# Patient Record
Sex: FEMALE | Race: WHITE | Employment: FULL TIME | URBAN - METROPOLITAN AREA
[De-identification: names, ages, dates, MRNs, and addresses within clinical notes are randomized per-mention and may not be internally consistent; named-entity substitution may affect disease eponyms.]

---

## 2023-02-10 ENCOUNTER — HOSPITAL ENCOUNTER (OUTPATIENT)
Dept: PHYSICAL THERAPY | Age: 58
Discharge: HOME | End: 2023-02-10
Payer: COMMERCIAL

## 2023-02-10 PROCEDURE — 97110 THERAPEUTIC EXERCISES: CPT | Performed by: PHYSICAL THERAPIST

## 2023-02-10 PROCEDURE — 97162 PT EVAL MOD COMPLEX 30 MIN: CPT | Performed by: PHYSICAL THERAPIST

## 2023-02-10 PROCEDURE — 97016 VASOPNEUMATIC DEVICE THERAPY: CPT | Performed by: PHYSICAL THERAPIST

## 2023-02-10 NOTE — PROGRESS NOTES
PT INITIAL EVALUATION NOTE 2-15    Patient Name: Niki Hopkins  Date:2/10/2023  : 1965  [x]  Patient  Verified  Payor: Renate Worthy / Plan: [address] / Product Type: Commerical /    In time:10:45 AM  Out time:11:45 AM  Total Treatment Time (min): 60  Visit #: 1     Treatment Area: Other low back pain [M54.59]  Pain in right hip [M25.551]    SUBJECTIVE  Pain Level (0-10 scale): 3/10  Any medication changes, allergies to medications, adverse drug reactions, diagnosis change, or new procedure performed?: [] No    [x] Yes (see summary sheet for update)  Subjective:     R low back pain  PLOF: No limitations with walking, standing, work tasks  Mechanism of Injury: Insidious, began in 2022 with R lateral hip pain. The pain was severe enough she went to a local ED at the time and imaging was negative. She then followed-up with her PCP, who prescribed Prednisone. This resolved the lateral R hip pain, but then she was left with a tightness in her R low back that feels like it wants to go into spasm. The pain is relieved by bending forward and resting her upper body on a table. Previous Treatment/Compliance: She has tried some stretching at home with mild relief. PMHx/Surgical Hx: She had an episode of L knee hyperextension shortly before the R hip pain started. She believes that the postural changes that occurred to protect her L knee caused the R back pain. Work Hx: She has tried adjusting her chair at work with some minor relief. The biggest aggravating factor occurs when she goes to stand up from her chair after sitting for several hours. 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 Living Situation: lives in a two story home with   Pt Goals: to reduce pain and improve ability stand from a chair, protect reoccurrences.    Barriers: none  Motivation: motivated  Substance use: none   Cognition: A & O x 4        OBJECTIVE/EXAMINATION  Posture: R shoulder lower than left  Gait and Functional Mobility:    Gait: WNL  Palpation: TTP along R lower lumbar paraspinals, PSIS  Joint Mobility:NT        Lumbar AROM:        R  L  Flexion    6\" from floor        Extension   WNL  WNL        Side Bending   WNL  Limited to 4\" from knee        Rotation   Limited  WNL          Aberrant movements:  Painful arc: [] Yes  [x] No  Instability catch: [] Yes  [x] No  Difficulty returning from flexion: [] Yes  [x] No  Reversal of lumbopelvic rhythm: [x] Yes  [] No             MMT:  All lumbar myotomes: >4/5  Neurological: Sensation: Intact  Special Tests:        Slump: Negative   Chris: NT        Kell: R low back pain at full hip extension    SLR: Positive at 60 degrees for R low back pain   ESTHELA: Positive for R low back pain      Practed several sit-to-stands with a normal pelvic position and in a position of L AF IR. No difference reported from patient and the maneuver was painful each time.     Modality rationale: decrease inflammation and decrease pain to improve the patients ability to sit for prolonged periods without pain   Min Type Additional Details    [] Estim: []Att   []Unatt        []TENS instruct                  []IFC  []Premod   []NMES                     []Other:  []w/US   []w/ice   []w/heat  Position:  Location:    []  Traction: [] Cervical       []Lumbar                       [] Prone          []Supine                       []Intermittent   []Continuous Lbs:  [] before manual  [] after manual  []w/heat    []  Ultrasound: []Continuous   [] Pulsed at:                            []1MHz   []3MHz Location:  W/cm2:    []  Paraffin         Location:  []w/heat    []  Ice     []  Heat  []  Ice massage Position:  Location:    []  Laser  []  Other: Position:  Location:   15 [x]  Vasopneumatic Device Pressure:       [x] lo [] med [] hi   Temperature: 34   [x] Skin assessment post-treatment:  [x]intact []redness- no adverse reaction    []redness - adverse reaction:     25 min Therapeutic Exercise:  [x] See flow sheet :   Rationale: increase ROM, increase strength, and improve coordination to improve the patients ability to perform a sit-to-stand without pain    With   [] TE   [] TA   [] Neuro   [] SC   [] other: Patient Education: [x] Review HEP    [] Progressed/Changed HEP based on:   [] positioning   [] body mechanics   [] transfers   [] heat/ice application    [] other:        Other Objective/Functional Measures: FOTO Functional Measure: 48/100                  Pain Level (0-10 scale) post treatment: 1/10      ASSESSMENT:      [x]  See Plan of Suraj Saenz, PT 2/10/2023

## 2023-02-10 NOTE — THERAPY EVALUATION
Physical Therapy at [facility],   a part of  [facility]  [address] [facility], [address] [place] [address] Drive  Phone: [phone]  Fax: [phone]    Plan of Care/ Statement of Necessity for Physical Therapy Services 2-15    Patient name: Leonardo Solano  : 1965  Provider#: [id number]  Referral source: Kevan Kasper, *      Medical/Treatment Diagnosis: Other low back pain [M54.59]  Pain in right hip [M25.551]     Prior Hospitalization: see medical history     Comorbidities: Obesity  Prior Level of Function: see initial eval  Medications: Verified on Patient Summary List    Start of Care: 2/10/2023      Onset Date: 2022       The Plan of Care and following information is based on the information from the initial evaluation. Assessment/ key information: The patient presents with right low back pain with previous radiation into the R hip, although that has resolved since a round of oral steroids several months ago. She demonstrates postural dysfunction, reduced ROM, and impaired lumbopelvic stability that could be contributing to the chronicity of her symptoms. Evaluation Complexity History MEDIUM  Complexity : 1-2 comorbidities / personal factors will impact the outcome/ POC ; Examination MEDIUM Complexity : 3 Standardized tests and measures addressing body structure, function, activity limitation and / or participation in recreation  ;Presentation MEDIUM Complexity : Evolving with changing characteristics  ; Clinical Decision Making MEDIUM Complexity : FOTO score of 26-74  Overall Complexity Rating: MEDIUM    Problem List: pain affecting function, decrease ROM, decrease strength, impaired gait/ balance, decrease ADL/ functional abilitiies, decrease activity tolerance, decrease flexibility/ joint mobility, and decrease transfer abilities   Treatment Plan may include any combination of the following: Therapeutic exercise, Neuromuscular reeducation, Manual therapy, Therapeutic activity, Self care/home management, Electric stim unattended , Vasopneumatic device, and Needle insertion w/o injection (1 or 2 muscles)  Patient / Family readiness to learn indicated by: asking questions, trying to perform skills, and interest  Persons(s) to be included in education: patient (P)  Barriers to Learning/Limitations: None  Patient Goal (s): I want to be able to get up from a chair without pain.   Patient Self Reported Health Status: excellent  Rehabilitation Potential: excellent    Short Term Goals: To be accomplished in 4 weeks:  Patient will be able to sit for 30 minutes with <2/10 R low back pain reported. Patient will be able to stand for 30 minutes with <2/10 R low back pain reported. Patient will be able to walk two blocks with <2/10 R low back pain reported. Long Term Goals: To be accomplished in 12 weeks:  Patient will be able to perform a sit-to-stand transfer without pain following a period of sitting for >1 hour. Patient will be able to  a 20# box from the floor without pain or limitation. Patient will be able to walk 1/4 mile without pain or limitation. Frequency / Duration: Patient to be seen 2 times per week for 12 weeks. Patient/ Caregiver education and instruction: self care, activity modification, and exercises    [x]  Plan of care has been reviewed with WOLF De Paz, PT 2/10/2023     ________________________________________________________________________    I certify that the above Therapy Services are being furnished while the patient is under my care. I agree with the treatment plan and certify that this therapy is necessary.     [de-identified] Signature:____________________  Date:____________Time: _________      Armand Parmar, *

## 2023-02-14 ENCOUNTER — HOSPITAL ENCOUNTER (OUTPATIENT)
Dept: PHYSICAL THERAPY | Age: 58
Discharge: HOME | End: 2023-02-14
Payer: COMMERCIAL

## 2023-02-14 PROCEDURE — 97110 THERAPEUTIC EXERCISES: CPT | Performed by: PHYSICAL THERAPIST

## 2023-02-14 PROCEDURE — 97016 VASOPNEUMATIC DEVICE THERAPY: CPT | Performed by: PHYSICAL THERAPIST

## 2023-02-14 NOTE — PROGRESS NOTES
PT DAILY TREATMENT NOTE 2-15    Patient Name: Carmelita Cisse  Date:2023  : 1965  [x]  Patient  Verified  Payor: Keegan Khalil / Plan: [address] / Product Type: Commerical /    In time: 9:30 AM  Out time: 10:25 AM  Total Treatment Time (min): 55  Visit #:  2    Treatment Area: Other low back pain [M54.59]  Pain in right hip [M25.551]    SUBJECTIVE  Pain Level (0-10 scale): 210  Any medication changes, allergies to medications, adverse drug reactions, diagnosis change, or new procedure performed?: [x] No    [] Yes (see summary sheet for update)  Subjective functional status/changes:   [] No changes reported  Patient reports that the HEP is going well and she feels like it is helping.     OBJECTIVE            Modality rationale: decrease inflammation and decrease pain to improve the patients ability to sit for prolonged periods without pain    Min Type Additional Details     [] Estim: []Att   []Unatt        []TENS instruct                  []IFC  []Premod   []NMES                     []Other:  []w/US   []w/ice   []w/heat  Position:  Location:     []  Traction: [] Cervical       []Lumbar                       [] Prone          []Supine                       []Intermittent   []Continuous Lbs:  [] before manual  [] after manual  []w/heat     []  Ultrasound: []Continuous   [] Pulsed at:                            []1MHz   []3MHz Location:  W/cm2:     []  Paraffin         Location:  []w/heat     []  Ice     []  Heat  []  Ice massage Position:  Location:     []  Laser  []  Other: Position:  Location:   15 [x]  Vasopneumatic Device Pressure:       [x] lo [] med [] hi   Temperature: 34   [x] Skin assessment post-treatment:  [x]intact []redness- no adverse reaction    []redness - adverse reaction:      40 min Therapeutic Exercise:  [x] See flow sheet :   Rationale: increase ROM, increase strength, and improve coordination to improve the patients ability to perform a sit-to-stand without pain          With   [] TE   [] TA   [] Neuro   [] SC   [] other: Patient Education: [x] Review HEP    [] Progressed/Changed HEP based on:   [] positioning   [] body mechanics   [] transfers   [] heat/ice application    [] other:      Other Objective/Functional Measures:   Unable to complete recumbent bike due to L knee pain     Pain Level (0-10 scale) post treatment: 0    ASSESSMENT/Changes in Function:   Patient will continue to benefit from skilled PT services to modify and progress therapeutic interventions, address functional mobility deficits, address ROM deficits, address strength deficits, analyze and address soft tissue restrictions, analyze and cue movement patterns, analyze and modify body mechanics/ergonomics, and assess and modify postural abnormalities to attain remaining goals. []  See Plan of Care  []  See progress note/recertification  []  See Discharge Summary         Progress towards goals / Updated goals:  Patient is showing good initial progress with PT with improved tolerance to sit-to-stand transfers at the end of the session.     PLAN  [x]  Upgrade activities as tolerated     [x]  Continue plan of care  []  Update interventions per flow sheet       []  Discharge due to:_  []  Other:_      Rut Terrazas, PT 2/14/2023

## 2023-02-16 ENCOUNTER — HOSPITAL ENCOUNTER (OUTPATIENT)
Dept: PHYSICAL THERAPY | Age: 58
Discharge: HOME | End: 2023-02-16
Payer: COMMERCIAL

## 2023-02-16 PROCEDURE — 97016 VASOPNEUMATIC DEVICE THERAPY: CPT | Performed by: PHYSICAL THERAPIST

## 2023-02-16 PROCEDURE — 97110 THERAPEUTIC EXERCISES: CPT | Performed by: PHYSICAL THERAPIST

## 2023-02-16 NOTE — PROGRESS NOTES
PT DAILY TREATMENT NOTE 2-15    Patient Name: Rosendo Yi  Date:2023  : 1965  [x]  Patient  Verified  Payor: Samia Riser / Plan: [address] / Product Type: Commerical /    In time: 4:15 PM Out time: 5:10 PM  Total Treatment Time (min): 55  Visit #:  3    Treatment Area: Other low back pain [M54.59]  Pain in right hip [M25.551]    SUBJECTIVE  Pain Level (0-10 scale): 0/10  Any medication changes, allergies to medications, adverse drug reactions, diagnosis change, or new procedure performed?: [x] No    [] Yes (see summary sheet for update)  Subjective functional status/changes:   [] No changes reported  Patient reports continued improvement at her low back and has started doing the seated stretching during the day at work.     OBJECTIVE            Modality rationale: decrease inflammation and decrease pain to improve the patients ability to sit for prolonged periods without pain    Min Type Additional Details     [] Estim: []Att   []Unatt        []TENS instruct                  []IFC  []Premod   []NMES                     []Other:  []w/US   []w/ice   []w/heat  Position:  Location:     []  Traction: [] Cervical       []Lumbar                       [] Prone          []Supine                       []Intermittent   []Continuous Lbs:  [] before manual  [] after manual  []w/heat     []  Ultrasound: []Continuous   [] Pulsed at:                            []1MHz   []3MHz Location:  W/cm2:     []  Paraffin         Location:  []w/heat     []  Ice     []  Heat  []  Ice massage Position:  Location:     []  Laser  []  Other: Position:  Location:   15 [x]  Vasopneumatic Device Pressure:       [x] lo [] med [] hi   Temperature: 34   [x] Skin assessment post-treatment:  [x]intact []redness- no adverse reaction    []redness - adverse reaction:      40 min Therapeutic Exercise:  [x] See flow sheet :   Rationale: increase ROM, increase strength, and improve coordination to improve the patients ability to perform a sit-to-stand without pain          With   [] TE   [] TA   [] Neuro   [] SC   [] other: Patient Education: [x] Review HEP    [] Progressed/Changed HEP based on:   [] positioning   [] body mechanics   [] transfers   [] heat/ice application    [] other:      Other Objective/Functional Measures:   No increase in LBP with new strengthening exercises. Pain Level (0-10 scale) post treatment: 0    ASSESSMENT/Changes in Function:   Patient will continue to benefit from skilled PT services to modify and progress therapeutic interventions, address functional mobility deficits, address ROM deficits, address strength deficits, analyze and address soft tissue restrictions, analyze and cue movement patterns, analyze and modify body mechanics/ergonomics, and assess and modify postural abnormalities to attain remaining goals. []  See Plan of Care  []  See progress note/recertification  []  See Discharge Summary         Progress towards goals / Updated goals:  Excellent progress this past week and will begin to shift focus from stretching to lumbopelvic stability exercises.     PLAN  [x]  Upgrade activities as tolerated     [x]  Continue plan of care  []  Update interventions per flow sheet       []  Discharge due to:_  []  Other:_      Jamel Vazquez, PT 2/16/2023

## 2023-02-20 ENCOUNTER — HOSPITAL ENCOUNTER (OUTPATIENT)
Dept: PHYSICAL THERAPY | Age: 58
Discharge: HOME | End: 2023-02-20
Payer: COMMERCIAL

## 2023-02-20 PROCEDURE — 97110 THERAPEUTIC EXERCISES: CPT | Performed by: PHYSICAL MEDICINE & REHABILITATION

## 2023-02-20 PROCEDURE — 97016 VASOPNEUMATIC DEVICE THERAPY: CPT | Performed by: PHYSICAL MEDICINE & REHABILITATION

## 2023-02-20 NOTE — PROGRESS NOTES
PT DAILY TREATMENT NOTE 2-15    Patient Name: Alpa Comment  Date:2023  : 1965  [x]  Patient  Verified  Payor: Mitch Cat / Plan: [address] / Product Type: Commerical /    In time: 4:15 PM Out time: 5:10 PM  Total Treatment Time (min): 55  Visit #:  4    Treatment Area:  Other low back pain [M54.59]  Pain in right hip [M25.551]    SUBJECTIVE  Pain Level (0-10 scale): 0/10  Any medication changes, allergies to medications, adverse drug reactions, diagnosis change, or new procedure performed?: [x] No    [] Yes (see summary sheet for update)  Subjective functional status/changes:   [] No changes reported  Patient reports only some pressure with lifting but no spasms recently    OBJECTIVE            Modality rationale: decrease inflammation and decrease pain to improve the patients ability to sit for prolonged periods without pain    Min Type Additional Details     [] Estim: []Att   []Unatt        []TENS instruct                  []IFC  []Premod   []NMES                     []Other:  []w/US   []w/ice   []w/heat  Position:  Location:     []  Traction: [] Cervical       []Lumbar                       [] Prone          []Supine                       []Intermittent   []Continuous Lbs:  [] before manual  [] after manual  []w/heat     []  Ultrasound: []Continuous   [] Pulsed at:                            []1MHz   []3MHz Location:  W/cm2:     []  Paraffin         Location:  []w/heat     []  Ice     []  Heat  []  Ice massage Position:  Location:     []  Laser  []  Other: Position:  Location:   15 [x]  Vasopneumatic Device Pressure:       [x] lo [] med [] hi   Temperature: 34   [x] Skin assessment post-treatment:  [x]intact []redness- no adverse reaction    []redness - adverse reaction:      40 min Therapeutic Exercise:  [x] See flow sheet :   Rationale: increase ROM, increase strength, and improve coordination to improve the patients ability to perform a sit-to-stand without pain With   [] TE   [] TA   [] Neuro   [] SC   [] other: Patient Education: [x] Review HEP    [] Progressed/Changed HEP based on:   [] positioning   [] body mechanics   [] transfers   [] heat/ice application    [] other:      Other Objective/Functional Measures:   1 LOB with SLS B, mod sway    Pain Level (0-10 scale) post treatment: 0    ASSESSMENT/Changes in Function:   Patient will continue to benefit from skilled PT services to modify and progress therapeutic interventions, address functional mobility deficits, address ROM deficits, address strength deficits, analyze and address soft tissue restrictions, analyze and cue movement patterns, analyze and modify body mechanics/ergonomics, and assess and modify postural abnormalities to attain remaining goals. []  See Plan of Care  []  See progress note/recertification  []  See Discharge Summary         Progress towards goals / Updated goals:  Excellent progress this past week and will begin to shift focus from stretching to lumbopelvic stability exercises.     PLAN  [x]  Upgrade activities as tolerated     [x]  Continue plan of care  []  Update interventions per flow sheet       []  Discharge due to:_  []  Other:_      Rica Bolds PTA, [facility], CPT  2/20/2023

## 2023-02-24 ENCOUNTER — APPOINTMENT (OUTPATIENT)
Dept: PHYSICAL THERAPY | Age: 58
End: 2023-02-24
Payer: COMMERCIAL

## 2023-02-27 ENCOUNTER — OFFICE VISIT (OUTPATIENT)
Dept: ORTHOPEDIC SURGERY | Age: 58
End: 2023-02-27
Payer: COMMERCIAL

## 2023-02-27 VITALS — HEIGHT: 63 IN | BODY MASS INDEX: 38.27 KG/M2 | WEIGHT: 216 LBS

## 2023-02-27 DIAGNOSIS — M43.16 SPONDYLOLISTHESIS OF LUMBAR REGION: ICD-10-CM

## 2023-02-27 DIAGNOSIS — E66.9 CLASS 2 OBESITY WITH BODY MASS INDEX (BMI) OF 38.0 TO 38.9 IN ADULT, UNSPECIFIED OBESITY TYPE, UNSPECIFIED WHETHER SERIOUS COMORBIDITY PRESENT: ICD-10-CM

## 2023-02-27 DIAGNOSIS — M54.50 CHRONIC RIGHT-SIDED LOW BACK PAIN WITHOUT SCIATICA: Primary | ICD-10-CM

## 2023-02-27 DIAGNOSIS — G89.29 CHRONIC RIGHT-SIDED LOW BACK PAIN WITHOUT SCIATICA: Primary | ICD-10-CM

## 2023-02-27 DIAGNOSIS — M51.36 DEGENERATIVE DISC DISEASE, LUMBAR: ICD-10-CM

## 2023-02-27 PROCEDURE — 99204 OFFICE O/P NEW MOD 45 MIN: CPT | Performed by: STUDENT IN AN ORGANIZED HEALTH CARE EDUCATION/TRAINING PROGRAM

## 2023-02-27 RX ORDER — IRBESARTAN AND HYDROCHLOROTHIAZIDE 150; 12.5 MG/1; MG/1
TABLET, FILM COATED ORAL
COMMUNITY

## 2023-02-27 RX ORDER — TIZANIDINE 2 MG/1
2 TABLET ORAL
Qty: 30 TABLET | Refills: 1 | Status: SHIPPED | OUTPATIENT
Start: 2023-02-27

## 2023-02-27 RX ORDER — MELOXICAM 7.5 MG/1
TABLET ORAL
COMMUNITY
Start: 2023-02-24

## 2023-02-27 RX ORDER — LIDOCAINE 50 MG/G
PATCH TOPICAL
COMMUNITY

## 2023-02-27 RX ORDER — CYCLOBENZAPRINE HCL 5 MG
TABLET ORAL
COMMUNITY
Start: 2022-12-19 | End: 2023-02-27 | Stop reason: ALTCHOICE

## 2023-02-27 RX ORDER — LOSARTAN POTASSIUM 25 MG/1
25 TABLET ORAL DAILY
COMMUNITY

## 2023-02-27 NOTE — PROGRESS NOTES
1. Have you been to the ER, urgent care clinic since your last visit? Hospitalized since your last visit? No    2. Have you seen or consulted any other health care providers outside of the [address] [place] [address] since your last visit? Include any pap smears or colon screening.  No  Chief Complaint   Patient presents with    Back Pain     Saw PCP in October 2022 and is in PT.

## 2023-02-27 NOTE — PROGRESS NOTES
Sumi Cheatham (: 1965) is a 65 y.o. female here for evaluation of the following chief complaint(s):  Back Pain (Saw PCP in 2022 and is in PT. Will bring in CT next time.)       ASSESSMENT/PLAN:  Below is the assessment and plan developed based on review of pertinent history, physical exam, labs, studies, and medications. 1. Chronic right-sided low back pain without sciatica  -     XR SPINE LUMB MIN 4 V; Future  2. Degenerative disc disease, lumbar  3. Spondylolisthesis of lumbar region  4. Class 2 obesity with body mass index (BMI) of 38.0 to 38.9 in adult, unspecified obesity type, unspecified whether serious comorbidity present      Return in about 6 weeks (around 4/10/2023) for Follow-up after PT and Aleve 2 tablets twice a day for up to 2 weeks. Continue physical therapy. Continue Aleve over-the-counter 2 tablets twice a day for up to 2 weeks at a time. I will discontinue Flexeril and add tizanidine from muscle spasms as needed. Follow-up in 4 to 6 weeks    Red flag symptoms discussed with the patient. Patient is to present to the emergency department if any of these symptoms occur. Patient verbalized understanding and agrees to proceed with the aforementioned plan. Thank you for allowing me to participate in the care of this patient. SUBJECTIVE/OBJECTIVE:    HPI    Patient is a pleasant 65 y.o. F who presents with atraumatic chronic midline mechanical low back pain without active radiculopathy or paresthesias. Patient has a history of radicular pain which resolved on its own. Symptoms have been slowly progressive over the past several.  It is worse with bending/lifting and prolonged sit and better with walking and lying. Patient ambulates without assist.  Normal ambulatory capacity. No gait instability. No fall history. No upper extremity dexterity issues. No bowel/bladder control issues. No other red flag symptoms.     Therapies (Rx/PT/Injections): Patient is currently in physical therapy and has participated in roughly 5 sessions. She has tried over-the-counter Aleve with some relief. She has tried Flexeril with no relief. Relevant PMH/Social Hx: Otherwise health. Relevant Orthopedic Hx: None. Prior Spine Surgery: None. Chief Complaint   Patient presents with    Back Pain     Saw PCP in October 2022 and is in PT. Will bring in CT next time. Current Outpatient Medications   Medication Sig    irbesartan-hydroCHLOROthiazide (AVALIDE) 150-12.5 mg per tablet one tablet    lidocaine (LIDODERM) 5 % 1 patch remove after 12 hours    losartan (COZAAR) 25 mg tablet Take 25 mg by mouth daily. meloxicam (MOBIC) 7.5 mg tablet TAKE 1 TABLET(7.5 MG) BY MOUTH IN THE MORNING    tiZANidine (ZANAFLEX) 2 mg tablet Take 1 Tablet by mouth every six to eight (6-8) hours as needed for Muscle Spasm(s). No current facility-administered medications for this visit. History reviewed. No pertinent past medical history. History reviewed. No pertinent surgical history. History reviewed. No pertinent family history.   Social History     Tobacco Use    Smoking status: Never    Smokeless tobacco: Never   Vaping Use    Vaping Use: Never used   Substance Use Topics    Alcohol use: Not Currently    Drug use: Never      Social History     Tobacco Use   Smoking Status Never   Smokeless Tobacco Never     Social History     Substance and Sexual Activity   Alcohol Use Not Currently       Review of Systems  Red flag symptoms: None  Bowel/Bladder/Saddle Anesthesia: Denies  Weakness/Sensory Disturbance: None  Ambulation/Falls: None  Constitutional Symptoms (F/C/NS/WL): Denies    Ht 5' 3\" (1.6 m)   Wt 216 lb (98 kg)   BMI 38.26 kg/m²      Physical Exam    GENERAL:  AAOx3, appears stated age, no distress  Body habitus: Obese    LOWER EXTREMITIES:  Gait: Intact heel and toe gait, normal tandem gait   Motor: 5/5 in all myotomes L3-S1 bilaterally  Sensory: Intact to light touch in all dermatomes L4-S1 bilaterally  Reflexes: Diminished but symmetric L4 and S1 bilaterally  Pathological reflexes: No sustained clonus, equivocal Babinski bilaterally   Special tests: Negative seated SLR bilateral    UPPER EXTREMITIES:  Grossly motor and sensory intact, negative Jaida's bilaterally    NECK/BACK:  Integumentary: Normal  Palpation/ROM: Paraspinal tenderness to palpation; decreased range of motion in all planes, primarily flexion/extension/rotation, secondary to pain and stiffness      IMAGING:    XR Results (most recent):  Results from Appointment encounter on 02/27/23    XR SPINE LUMB MIN 4 V    Narrative  4 view lumbar spine including flexion-extension with severe multilevel disc degeneration primarily at L1-L2, L2-L3, and L5-S1. Stable grade 1 retrolisthesis at L2-L3. Multilevel posterior facet arthrosis. No gross instability on dynamic films. Mild coronal deformity. Limited views of the pelvis and bilateral hips demonstrating moderate hip osteoarthritis               An electronic signature was used to authenticate this note.   -- Mamie Norwood, DO

## 2023-02-28 ENCOUNTER — HOSPITAL ENCOUNTER (OUTPATIENT)
Dept: PHYSICAL THERAPY | Age: 58
Discharge: HOME | End: 2023-02-28
Payer: COMMERCIAL

## 2023-02-28 PROCEDURE — 97016 VASOPNEUMATIC DEVICE THERAPY: CPT | Performed by: PHYSICAL THERAPIST

## 2023-02-28 PROCEDURE — 97110 THERAPEUTIC EXERCISES: CPT | Performed by: PHYSICAL THERAPIST

## 2023-02-28 NOTE — PROGRESS NOTES
PT DAILY TREATMENT NOTE 2-15    Patient Name: Jazmyne Ear  Date:2023  : 1965  [x]  Patient  Verified  Payor: [employer] / Plan: [address] / Product Type: Commerical /    In time: 4:15 PM Out time: 5:10 PM  Total Treatment Time (min): 55  Visit #:  5    Treatment Area: Other low back pain [M54.59]  Pain in right hip [M25.551]    SUBJECTIVE  Pain Level (0-10 scale): 0/10  Any medication changes, allergies to medications, adverse drug reactions, diagnosis change, or new procedure performed?: [x] No    [] Yes (see summary sheet for update)  Subjective functional status/changes:   [] No changes reported  Patient feels fine today, but did notice increased pain after her last visit. She believes it is related to the standing hip abd exercise as it exacerbated pain at home during her HEP.     OBJECTIVE            Modality rationale: decrease inflammation and decrease pain to improve the patients ability to sit for prolonged periods without pain    Min Type Additional Details     [] Estim: []Att   []Unatt        []TENS instruct                  []IFC  []Premod   []NMES                     []Other:  []w/US   []w/ice   []w/heat  Position:  Location:     []  Traction: [] Cervical       []Lumbar                       [] Prone          []Supine                       []Intermittent   []Continuous Lbs:  [] before manual  [] after manual  []w/heat     []  Ultrasound: []Continuous   [] Pulsed at:                            []1MHz   []3MHz Location:  W/cm2:     []  Paraffin         Location:  []w/heat     []  Ice     []  Heat  []  Ice massage Position:  Location:     []  Laser  []  Other: Position:  Location:   15 [x]  Vasopneumatic Device Pressure:       [x] lo [] med [] hi   Temperature: 34   [x] Skin assessment post-treatment:  [x]intact []redness- no adverse reaction    []redness - adverse reaction:      40 min Therapeutic Exercise:  [x] See flow sheet :   Rationale: increase ROM, increase strength, and improve coordination to improve the patients ability to perform a sit-to-stand without pain          With   [] TE   [] TA   [] Neuro   [] SC   [] other: Patient Education: [x] Review HEP    [] Progressed/Changed HEP based on:   [] positioning   [] body mechanics   [] transfers   [] heat/ice application    [] other:      Other Objective/Functional Measures:       Pain Level (0-10 scale) post treatment: 0    ASSESSMENT/Changes in Function:   Held standing hip abd and will reassess next visit on any significant changes. Patient will continue to benefit from skilled PT services to modify and progress therapeutic interventions, address functional mobility deficits, address ROM deficits, address strength deficits, analyze and address soft tissue restrictions, analyze and cue movement patterns, analyze and modify body mechanics/ergonomics, and assess and modify postural abnormalities to attain remaining goals. []  See Plan of Care  []  See progress note/recertification  []  See Discharge Summary         Progress towards goals / Updated goals:  No progress this past week towards short term goals, but the patient continues to have good pain relief compared to the muscle spasms from the initial evaluation.     PLAN  [x]  Upgrade activities as tolerated     [x]  Continue plan of care  []  Update interventions per flow sheet       []  Discharge due to:_  []  Other:_      Bang Stephens, PT PTA, OPTA, CPT  2/28/2023

## 2023-03-02 ENCOUNTER — APPOINTMENT (OUTPATIENT)
Dept: PHYSICAL THERAPY | Age: 58
End: 2023-03-02
Payer: COMMERCIAL

## 2023-03-07 ENCOUNTER — HOSPITAL ENCOUNTER (OUTPATIENT)
Dept: PHYSICAL THERAPY | Age: 58
Discharge: HOME | End: 2023-03-07
Payer: COMMERCIAL

## 2023-03-07 PROCEDURE — 97110 THERAPEUTIC EXERCISES: CPT | Performed by: PHYSICAL THERAPIST

## 2023-03-07 NOTE — PROGRESS NOTES
PT DAILY TREATMENT NOTE 2-15    Patient Name: Alvester Siemens  Date:3/7/2023  : 1965  [x]  Patient  Verified  Payor: [employer] / Plan: [address] / Product Type: Commerical /    In time: 5:40 PM Out time: 6:20 PM  Total Treatment Time (min): 40  Visit #:  6    Treatment Area: Other low back pain [M54.59]  Pain in right hip [M25.551]    SUBJECTIVE  Pain Level (0-10 scale): 0/10  Any medication changes, allergies to medications, adverse drug reactions, diagnosis change, or new procedure performed?: [x] No    [] Yes (see summary sheet for update)  Subjective functional status/changes:   [] No changes reported  Patient reports feeling much better overall and is happy with her progress.     OBJECTIVE            Modality rationale: Patient declined    Min Type Additional Details     [] Estim: []Att   []Unatt        []TENS instruct                  []IFC  []Premod   []NMES                     []Other:  []w/US   []w/ice   []w/heat  Position:  Location:     []  Traction: [] Cervical       []Lumbar                       [] Prone          []Supine                       []Intermittent   []Continuous Lbs:  [] before manual  [] after manual  []w/heat     []  Ultrasound: []Continuous   [] Pulsed at:                            []1MHz   []3MHz Location:  W/cm2:     []  Paraffin         Location:  []w/heat     []  Ice     []  Heat  []  Ice massage Position:  Location:     []  Laser  []  Other: Position:  Location:    []  Vasopneumatic Device Pressure:       [x] lo [] med [] hi   Temperature: 34   [x] Skin assessment post-treatment:  [x]intact []redness- no adverse reaction    []redness - adverse reaction:      40 min Therapeutic Exercise:  [x] See flow sheet :   Rationale: increase ROM, increase strength, and improve coordination to improve the patients ability to perform a sit-to-stand without pain          With   [] TE   [] TA   [] Neuro   [] SC   [] other: Patient Education: [x] Review HEP    [] Progressed/Changed HEP based on:   [] positioning   [] body mechanics   [] transfers   [] heat/ice application    [] other:      Other Objective/Functional Measures: FOTO: 67    Pain Level (0-10 scale) post treatment: 0    ASSESSMENT/Changes in Function:   Patient will continue to benefit from skilled PT services to modify and progress therapeutic interventions, address functional mobility deficits, address ROM deficits, address strength deficits, analyze and address soft tissue restrictions, analyze and cue movement patterns, analyze and modify body mechanics/ergonomics, and assess and modify postural abnormalities to attain remaining goals. []  See Plan of Care  []  See progress note/recertification  [x]  See Discharge Summary          PLAN  [x]  Upgrade activities as tolerated     [x]  Continue plan of care  []  Update interventions per flow sheet       []  Discharge due to:_  []  Other:_      Devonte Deng, PT , DPT, OCS, Cert.  DN   3/7/2023

## 2023-03-10 ENCOUNTER — APPOINTMENT (OUTPATIENT)
Dept: PHYSICAL THERAPY | Age: 58
End: 2023-03-10
Payer: COMMERCIAL

## 2023-05-19 RX ORDER — MELOXICAM 7.5 MG/1
TABLET ORAL
COMMUNITY
Start: 2023-02-24

## 2023-05-19 RX ORDER — LIDOCAINE 50 MG/G
PATCH TOPICAL
COMMUNITY

## 2023-05-19 RX ORDER — LOSARTAN POTASSIUM 25 MG/1
25 TABLET ORAL DAILY
COMMUNITY

## 2023-05-19 RX ORDER — IRBESARTAN AND HYDROCHLOROTHIAZIDE 150; 12.5 MG/1; MG/1
TABLET, FILM COATED ORAL
COMMUNITY

## 2023-05-19 RX ORDER — TIZANIDINE 2 MG/1
2 TABLET ORAL
COMMUNITY
Start: 2023-02-27

## 2024-09-06 ENCOUNTER — HOSPITAL ENCOUNTER (OUTPATIENT)
Facility: HOSPITAL | Age: 59
Discharge: HOME | End: 2024-09-09

## 2024-09-06 DIAGNOSIS — Z13.6 SCREENING FOR ISCHEMIC HEART DISEASE: ICD-10-CM

## 2024-09-06 PROCEDURE — 75571 CT HRT W/O DYE W/CA TEST: CPT

## 2024-12-30 ENCOUNTER — HOSPITAL ENCOUNTER (OUTPATIENT)
Facility: HOSPITAL | Age: 59
Discharge: HOME | End: 2025-01-02

## 2024-12-30 DIAGNOSIS — R91.1 PULMONARY NODULE: ICD-10-CM
